# Patient Record
Sex: MALE | Race: AMERICAN INDIAN OR ALASKA NATIVE
[De-identification: names, ages, dates, MRNs, and addresses within clinical notes are randomized per-mention and may not be internally consistent; named-entity substitution may affect disease eponyms.]

---

## 2021-04-16 ENCOUNTER — HOSPITAL ENCOUNTER (EMERGENCY)
Dept: HOSPITAL 5 - ED | Age: 31
LOS: 1 days | Discharge: HOME | End: 2021-04-17
Payer: SELF-PAY

## 2021-04-16 VITALS — DIASTOLIC BLOOD PRESSURE: 80 MMHG | SYSTOLIC BLOOD PRESSURE: 137 MMHG

## 2021-04-16 DIAGNOSIS — R30.0: Primary | ICD-10-CM

## 2021-04-16 DIAGNOSIS — Z79.899: ICD-10-CM

## 2021-04-16 PROCEDURE — 81001 URINALYSIS AUTO W/SCOPE: CPT

## 2021-04-17 LAB
BACTERIA #/AREA URNS HPF: (no result) /HPF
BILIRUB UR QL STRIP: (no result)
BLOOD UR QL VISUAL: (no result)
MUCOUS THREADS #/AREA URNS HPF: (no result) /HPF
PH UR STRIP: 7 [PH] (ref 5–7)
PROT UR STRIP-MCNC: (no result) MG/DL
RBC #/AREA URNS HPF: 19 /HPF (ref 0–6)
UROBILINOGEN UR-MCNC: 2 MG/DL (ref ?–2)
WBC #/AREA URNS HPF: > 182 /HPF (ref 0–6)

## 2021-04-17 NOTE — EMERGENCY DEPARTMENT REPORT
ED Male  HPI





- General


Chief complaint: Urogenital-Male


Stated complaint: BUMP IN BETWEEN THIGHS


Time Seen by Provider: 04/17/21 00:28


Source: patient


Mode of arrival: Ambulatory


Limitations: No Limitations





- History of Present Illness


Initial comments: 





31-year-old male male that emerge department complaining of pain and dysuria at 

the base of his penis for the last 2 days.  States he was incarcerated on Monday

and released on Wednesday had sex on Thursday and the pain became pertinent this

morning.  States that during the sexual episode he did notice some pain but 

thought he just might of hurt her something.  States that he had no sex for 

quite a while prior to this third episode.


MD Complaint: penile discharge, dysuria, groin pain


-: Gradual, days(s) (2)


Location: penis (Base of his penis)


Radiation: none


Severity: mild


Quality: aching


Consistency: constant


Improves with: none


Worsens with: urination


discharge.  denies: urinary retention, blood in urine, nausea/vomiting, 

incontinence





- Related Data


Sexually active: Yes


                                  Previous Rx's











 Medication  Instructions  Recorded  Last Taken  Type


 


Doxycycline Hyclate 100 mg PO BID #20 tablet. 04/17/21 Unknown Rx


 


metroNIDAZOLE [Flagyl] 2,000 mg PO ONCE.ED #4 tab 04/17/21 Unknown Rx











                                    Allergies











Allergy/AdvReac Type Severity Reaction Status Date / Time


 


No Known Allergies Allergy   Unverified 04/16/21 22:57














ED Review of Systems


ROS: 


Stated complaint: BUMP IN BETWEEN THIGHS


Other details as noted in HPI





Comment: All other systems reviewed and negative





ED Past Medical Hx





- Past Medical History


Previous Medical History?: No





- Surgical History


Past Surgical History?: No





- Social History


Smoking Status: Never Smoker





- Medications


Home Medications: 


                                Home Medications











 Medication  Instructions  Recorded  Confirmed  Last Taken  Type


 


Doxycycline Hyclate 100 mg PO BID #20 tablet. 04/17/21  Unknown Rx


 


metroNIDAZOLE [Flagyl] 2,000 mg PO ONCE.ED #4 tab 04/17/21  Unknown Rx














ED Physical Exam





- General


Limitations: No Limitations


General appearance: alert, in no apparent distress





- Head


Head exam: Present: atraumatic, normocephalic





- Eye


Eye exam: Present: normal appearance





- ENT


ENT exam: Present: mucous membranes moist





- Neck


Neck exam: Present: normal inspection





- Respiratory


Respiratory exam: Present: normal lung sounds bilaterally.  Absent: respiratory 

distress





- Cardiovascular


Cardiovascular Exam: Present: regular rate, normal rhythm.  Absent: systolic 

murmur, diastolic murmur, rubs, gallop





- GI/Abdominal


GI/Abdominal exam: Present: soft, tenderness (His lymphadenopathy to the right 

inguinal region.  No lower abdominal pain with palpation.  No CVA tenderness.), 

normal bowel sounds





- Rectal


Rectal exam: Present: deferred





- Extremities Exam


Extremities exam: Present: normal inspection





- Back Exam


Back exam: Present: normal inspection





- Neurological Exam


Neurological exam: Present: alert, oriented X3





- Psychiatric


Psychiatric exam: Present: normal affect, normal mood





- Skin


Skin exam: Present: warm, dry, intact, normal color.  Absent: rash





ED Course


                                   Vital Signs











  04/16/21





  22:57


 


Temperature 100.1 F H


 


Pulse Rate 79


 


Respiratory 18





Rate 


 


Blood Pressure 137/80


 


O2 Sat by Pulse 100





Oximetry 














ED Medical Decision Making





- Lab Data








                                   Lab Results











  04/17/21 Range/Units





  Unknown 


 


Urine Color  Yellow  (Yellow)  


 


Urine Turbidity  Slightly-cloudy  (Clear)  


 


Urine pH  7.0  (5.0-7.0)  


 


Ur Specific Gravity  1.025  (1.003-1.030)  


 


Urine Protein  <15 mg/dl  (Negative)  mg/dL


 


Urine Glucose (UA)  Neg  (Negative)  mg/dL


 


Urine Ketones  Neg  (Negative)  mg/dL


 


Urine Blood  Neg  (Negative)  


 


Urine Nitrite  Neg  (Negative)  


 


Urine Bilirubin  Neg  (Negative)  


 


Urine Urobilinogen  2.0  (<2.0)  mg/dL


 


Ur Leukocyte Esterase  Mod  (Negative)  


 


Urine WBC (Auto)  > 182.0 H  (0.0-6.0)  /HPF


 


Urine RBC (Auto)  19.0  (0.0-6.0)  /HPF


 


Urine Bacteria (Auto)  1+  (Negative)  /HPF


 


Urine Mucus  2+  /HPF














- Medical Decision Making





This patient presents to the emergency department with symptoms consistent with 

acute uncomplicated cystitis.  No systemic symptoms.  Not septic.  She is well-

appearing.  Low suspicion for acute pyelonephritis given the lack of fever, CVA 

tenderness, or systemic features.  Low suspicion for for kidney stone or 

infected stone.  Not in age range for pregnancy and her history and and 

presentation are complicated.  No no indications for labs or imaging at this 

time.  


Critical care attestation.: 


If time is entered above; I have spent that time in minutes in the direct care 

of this critically ill patient, excluding procedure time.








ED Disposition


Clinical Impression: 


 Dysuria





Disposition: DC-01 TO HOME OR SELFCARE


Is pt being admited?: No


Does the pt Need Aspirin: No


Condition: Stable


Instructions:  Dysuria


Prescriptions: 


Doxycycline Hyclate 100 mg PO BID #20 tablet.


metroNIDAZOLE [Flagyl] 2,000 mg PO ONCE.ED #4 tab


Referrals: 


[facility],MD [Primary Care Provider] - 3-5 Days


[facility] [Provider Group] - 3-5 Days


[facility] [Outside] - 3-5 Days